# Patient Record
Sex: MALE | Race: WHITE | Employment: STUDENT | RURAL
[De-identification: names, ages, dates, MRNs, and addresses within clinical notes are randomized per-mention and may not be internally consistent; named-entity substitution may affect disease eponyms.]

---
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
## 2022-02-07 ENCOUNTER — TELEPHONE (OUTPATIENT)
Dept: FAMILY MEDICINE CLINIC | Facility: CLINIC | Age: 22
End: 2022-02-07

## 2022-02-07 NOTE — TELEPHONE ENCOUNTER
"Tried contacting patient regarding his 10:30 appointment today with . Just wanting to get some more information on his reasoning for coming in. Per  \"Call and see to get patient on nurse schedule for a Covid test if patient does not have any symptoms.\" If patient has symptoms, we will see him like normal and we can swab him, but if he has no symptoms and is only wanting to get tested, he will have to be added to the nurse visit for a no symptom Covid testing.     Hub may relay message and get more information.   "

## 2022-04-07 ENCOUNTER — OFFICE VISIT (OUTPATIENT)
Dept: FAMILY MEDICINE CLINIC | Facility: CLINIC | Age: 22
End: 2022-04-07

## 2022-04-07 ENCOUNTER — LAB (OUTPATIENT)
Dept: LAB | Facility: HOSPITAL | Age: 22
End: 2022-04-07

## 2022-04-07 ENCOUNTER — HOSPITAL ENCOUNTER (OUTPATIENT)
Dept: GENERAL RADIOLOGY | Facility: HOSPITAL | Age: 22
Discharge: HOME | End: 2022-04-07

## 2022-04-07 VITALS
TEMPERATURE: 97.8 F | SYSTOLIC BLOOD PRESSURE: 108 MMHG | RESPIRATION RATE: 14 BRPM | BODY MASS INDEX: 30.49 KG/M2 | DIASTOLIC BLOOD PRESSURE: 74 MMHG | WEIGHT: 183 LBS | HEIGHT: 65 IN | OXYGEN SATURATION: 97 % | HEART RATE: 85 BPM

## 2022-04-07 DIAGNOSIS — M25.50 ARTHRALGIA, UNSPECIFIED JOINT: ICD-10-CM

## 2022-04-07 DIAGNOSIS — G89.29 CHRONIC PAIN OF BOTH KNEES: ICD-10-CM

## 2022-04-07 DIAGNOSIS — M25.562 CHRONIC PAIN OF BOTH KNEES: ICD-10-CM

## 2022-04-07 DIAGNOSIS — M25.561 CHRONIC PAIN OF BOTH KNEES: ICD-10-CM

## 2022-04-07 DIAGNOSIS — M25.50 ARTHRALGIA, UNSPECIFIED JOINT: Primary | ICD-10-CM

## 2022-04-07 DIAGNOSIS — M25.559 HIP PAIN: ICD-10-CM

## 2022-04-07 PROBLEM — K21.9 ACID REFLUX: Status: ACTIVE | Noted: 2022-04-07

## 2022-04-07 PROBLEM — G25.81 RESTLESS LEG SYNDROME: Status: ACTIVE | Noted: 2022-04-07

## 2022-04-07 PROCEDURE — 86038 ANTINUCLEAR ANTIBODIES: CPT

## 2022-04-07 PROCEDURE — 86431 RHEUMATOID FACTOR QUANT: CPT

## 2022-04-07 PROCEDURE — 86140 C-REACTIVE PROTEIN: CPT

## 2022-04-07 PROCEDURE — 84550 ASSAY OF BLOOD/URIC ACID: CPT

## 2022-04-07 PROCEDURE — 73560 X-RAY EXAM OF KNEE 1 OR 2: CPT

## 2022-04-07 PROCEDURE — 99214 OFFICE O/P EST MOD 30 MIN: CPT | Performed by: FAMILY MEDICINE

## 2022-04-07 PROCEDURE — 36415 COLL VENOUS BLD VENIPUNCTURE: CPT

## 2022-04-07 PROCEDURE — 73522 X-RAY EXAM HIPS BI 3-4 VIEWS: CPT

## 2022-04-07 RX ORDER — MELOXICAM 7.5 MG/1
7.5 TABLET ORAL DAILY PRN
Qty: 30 TABLET | Refills: 3 | Status: SHIPPED | OUTPATIENT
Start: 2022-04-07

## 2022-04-07 NOTE — PROGRESS NOTES
"Chief Complaint  bilateral leg pain x 1 year off and on    Subjective          Bhargav Ayala presents to [facility]  History of Present Illness     He presents today to establish care. Last PCP visit around 6 months ago.     He is a slow learner and told he is mentally disabled. In school at [employer] with IEP. He can't concentrate.     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 Seizure onset his age 18. He sees 2 neurologists. 4 months ago neurologist recommended half pad for shoes, he is shorter on one side than the other. He was prescribed steroids for Urgent Care for leg pain. He has complained of hips, knees, and feet pain. He improved with steroids and less pain. Pain for a year. He fell and hurt his back a few years ago. Complains some of his back.     Using OTC acid reflux nexium.           Objective   Vital Signs:   /74   Pulse 85   Temp 97.8 °F (36.6 °C) (Infrared)   Resp 14   Ht 164.5 cm (64.76\")   Wt 83 kg (183 lb)   SpO2 97%   BMI 30.68 kg/m²            Physical Exam  Vitals reviewed.   Constitutional:       General: He is not in acute distress.     Appearance: He is obese. He is not ill-appearing.   Cardiovascular:      Rate and Rhythm: Normal rate and regular rhythm.   Pulmonary:      Effort: Pulmonary effort is normal.      Breath sounds: Normal breath sounds.   Musculoskeletal:      Right hip: Normal range of motion.      Left hip: Normal range of motion.      Right knee: No deformity, effusion, erythema, bony tenderness or crepitus. Normal range of motion. No ACL laxity or PCL laxity.      Instability Tests: Anterior drawer test negative. Posterior drawer test negative. Medial Zaria test negative and lateral Zaria test negative.      Left knee: No deformity, effusion, erythema, bony tenderness or crepitus. Normal range of motion. No ACL laxity or PCL laxity.     Instability Tests: Anterior drawer test negative. Posterior drawer test negative. " Medial Zaria test negative and lateral Zaria test negative.   Neurological:      Mental Status: He is alert.      Gait: Gait abnormal ( out-toeing).   Psychiatric:         Behavior: Behavior is withdrawn. Behavior is cooperative.        Result Review :                 Assessment and Plan    Diagnoses and all orders for this visit:    1. Arthralgia, unspecified joint (Primary)  -     Rheumatoid factor; Future  -     C-reactive protein; Future  -     DAMASO; Future  -     Uric acid; Future  -     XR Hips Bilateral With or Without Pelvis 3-4 View; Future  -     Cancel: XR Knee 1 or 2 View Left; Future  -     XR Knee 1 or 2 View Right; Future    2. Hip pain  -     XR Hips Bilateral With or Without Pelvis 3-4 View; Future  -     meloxicam (Mobic) 7.5 MG tablet; Take 1 tablet by mouth Daily As Needed for Moderate Pain .  Dispense: 30 tablet; Refill: 3  -     Ambulatory Referral to Physical Therapy Evaluate and treat    3. Chronic pain of both knees  -     Cancel: XR Knee 1 or 2 View Left; Future  -     XR Knee 1 or 2 View Right; Future  -     meloxicam (Mobic) 7.5 MG tablet; Take 1 tablet by mouth Daily As Needed for Moderate Pain .  Dispense: 30 tablet; Refill: 3  -     Ambulatory Referral to Physical Therapy Evaluate and treat    Prior PCP records requested.    Patient has had multiple joint involvement with pain focused primarily on lower extremities.  Check rheumatological work-up with labs and x-rays today.  Start meloxicam as needed for pain and avoid other over-the-counter NSAIDs but may use Tylenol for milder pain.  Start physical therapy.  Reassess after started on therapy treatment plan.      Follow Up   Return in about 6 weeks (around 5/19/2022) for Office visit hip, knee pain .  Patient was given instructions and counseling regarding his condition or for health maintenance advice. Please see specific information pulled into the AVS if appropriate.     Electronically signed by Carolina Viveros MD,  04/07/22, 11:49 AM EDT.

## 2022-04-08 LAB
ANA SER QL: NEGATIVE
CHROMATIN AB SERPL-ACNC: <10 IU/ML (ref 0–14)
CRP SERPL-MCNC: 0.4 MG/DL (ref 0–0.5)
URATE SERPL-MCNC: 4.9 MG/DL (ref 3.4–7)

## 2022-04-22 ENCOUNTER — TREATMENT (OUTPATIENT)
Dept: PHYSICAL THERAPY | Facility: CLINIC | Age: 22
End: 2022-04-22

## 2022-04-22 DIAGNOSIS — M79.605 PAIN IN BOTH LOWER EXTREMITIES: Primary | ICD-10-CM

## 2022-04-22 DIAGNOSIS — M79.604 PAIN IN BOTH LOWER EXTREMITIES: Primary | ICD-10-CM

## 2022-04-22 PROCEDURE — 97161 PT EVAL LOW COMPLEX 20 MIN: CPT | Performed by: PHYSICAL THERAPIST

## 2022-04-22 PROCEDURE — 97530 THERAPEUTIC ACTIVITIES: CPT | Performed by: PHYSICAL THERAPIST

## 2022-04-22 NOTE — PROGRESS NOTES
Physical Therapy Initial Evaluation and Plan of Care      Patient: Bhargav Ayala   : 2000  Diagnosis/ICD-10 Code:  No primary diagnosis found.  Referring practitioner: Carolina Cason*    Subjective Evaluation    History of Present Illness  Date of onset: 2017  Mechanism of injury: Insidious onset    Subjective comment: Has had low back, hip, knee and ankle/foot pain on both sides since he was about 16 years old.  Has been seeing a neurologist for seizure activity.  The neurologist felt like his low back pain and leg pain due to a leg length discrepancy.  Left leg was said to be shorter than the right.  Therefore, his mother purchased an insert from [employer] for the left shoe that has not helped.    Patient Occupation: Senior at [employer] Quality of life: good    Pain  Alleviating factors: Tylenol and Ibuprofen (somewhat helpful)  Exacerbated by: Standing too long; sitting too long; bending over; some sleep disturbance.  Progression: worsening    Social Support  Lives with: parents    Treatments  Previous treatment: medication  Patient Goals  Patient goals for therapy: decreased pain           *LEFS:  48/80    Objective          Active Range of Motion     Additional Active Range of Motion Details  *Standing trunk flexion/extension/R sidebend/L sidebend:  70 deg/35 deg/55 deg/55 deg    Passive Range of Motion   Left Knee   Flexion: 119 degrees   Extension: 0 degrees     Right Knee   Flexion: 125 degrees   Extension: 0 degrees     Additional Passive Range of Motion Details  *Hip PROM flx/IR/ER:  R->110 deg/35 deg/60 deg; L->90 deg/35 deg/60 deg; all planes of movement aggravate anterior and proximal tibial regions (L>R)          Assessment & Plan     Assessment  Impairments: activity intolerance, pain with function and weight-bearing intolerance  Functional Limitations: sleeping, walking and standing  Assessment details: Mr. Ayala is a 21 year old male that is accompanied by his  mother for physical therapy evaluation today.  PMH was covered and reviewed during interview.  Trunk, hip and knee mobility are within normal limits bilaterally.  Has pes planus bilaterally that is likely contributing to the generalized polyarthalgia of the lower extremities.  Provided recommendation for Sole inserts to ease his joint pain.  If this is successful, we will move to custom orthotics.  I feel confident that this will eradicate his leg pain bilaterally.  Will follow up in 2 weeks, so that he can get adjusted to the new inserts.  Prognosis: good    Goals  Plan Goals: STGs:  1.)  LEFS improved x 1 MCID in 4 weeks.  2.)  Self report at least 50% improvement in pain and function in 4 weeks.  LTGs:  1.)  Have no pain with walking, standing and/or sitting in a 24 hour period in 8 weeks.    Plan  Therapy options: will be seen for skilled therapy services  Planned therapy interventions: home exercise program, balance/weight-bearing training, strengthening, stretching, therapeutic activities, manual therapy and functional ROM exercises  Frequency: 1x week  Duration in visits: 8  Duration in weeks: 8  Treatment plan discussed with: patient (mother)      *Protracted discussion with client and client's mother regarding potential pathology of symptoms, how to manage symptoms and advice for easing leg pain via recommendation for Sole inserts (billed as TA below).    Manual Therapy:         mins  91684;  Therapeutic Exercise:         mins  91766;     Neuromuscular Candelario:        mins  73088;    Therapeutic Activity:     9     mins  94211;     Gait Training:           mins  68580;     Ultrasound:          mins  49762;    Electrical Stimulation:         mins  47454 (MC );  Dry Needling          mins self-pay    Timed Treatment:   9   mins   Total Treatment:     40   mins    PT SIGNATURE: Neftali Roberts, PT   DATE TREATMENT INITIATED: 4/22/2022    Initial Certification  Certification Period: 7/21/2022  I certify  that the therapy services are furnished while this patient is under my care.  The services outlined above are required by this patient, and will be reviewed every 90 days.     PHYSICIAN: Carolina Rodríguez MD  NPI: [id number]                                      DATE:    Please sign and return via fax to [phone].. Thank you, [facility].